# Patient Record
Sex: MALE | Race: WHITE | NOT HISPANIC OR LATINO | URBAN - METROPOLITAN AREA
[De-identification: names, ages, dates, MRNs, and addresses within clinical notes are randomized per-mention and may not be internally consistent; named-entity substitution may affect disease eponyms.]

---

## 2018-10-30 ENCOUNTER — OFFICE (OUTPATIENT)
Dept: URBAN - METROPOLITAN AREA CLINIC 33 | Facility: CLINIC | Age: 78
End: 2018-10-30

## 2018-10-30 VITALS
DIASTOLIC BLOOD PRESSURE: 85 MMHG | WEIGHT: 265 LBS | HEART RATE: 67 BPM | HEIGHT: 73 IN | TEMPERATURE: 97 F | SYSTOLIC BLOOD PRESSURE: 154 MMHG

## 2018-10-30 DIAGNOSIS — K21.9 GASTRO-ESOPHAGEAL REFLUX DISEASE WITHOUT ESOPHAGITIS: ICD-10-CM

## 2018-10-30 DIAGNOSIS — Z12.11 ENCOUNTER FOR SCREENING FOR MALIGNANT NEOPLASM OF COLON: ICD-10-CM

## 2018-10-30 PROCEDURE — 99204 OFFICE O/P NEW MOD 45 MIN: CPT

## 2018-10-30 NOTE — SERVICEHPINOTES
PAULO GUTIÉRREZ   is a   77   year old male who is being seen in consultation at the request of   RJ ARECHIGA   for colon cancer screening. Per patient, the last colonoscopy was done in 2008 at [facility]. That was the only colonoscopy he ever had. Denies personal history of colon polyps. No records to review at this time. BRReports having normal bowel movement daily on BSS type 4. Denies blood in stool or melena. Reports occasional diarrhea. Denies abdominal pain or weight loss. BRDenies nausea or vomiting. Reports acid reflux every a couple of days, depends on what he eats. Takes TUMs and it controls. Reports a long standing history of acid reflux 30-40 years. Never had an EGD. Denies trouble swallowing, epigastric pain or early satiety. Medical history includes a history of prostate cancer in 2013 s/p radiation therapy, diabetes, high cholesterol, anxiety, and thyroid disease. BRSurgical history include appendectomy in 1965 and C and spine surgery in 2015.Denies chest pain, palpitations or sob. Denies family history of colon cancer.  Takes 2 tabs of baby aspirin for prevention. Last blood work was done in August or September with pcp. No records to review at this time. BR

## 2018-12-18 ENCOUNTER — ON CAMPUS - OUTPATIENT (OUTPATIENT)
Dept: URBAN - METROPOLITAN AREA HOSPITAL 14 | Facility: HOSPITAL | Age: 78
End: 2018-12-18

## 2018-12-18 DIAGNOSIS — K63.5 POLYP OF COLON: ICD-10-CM

## 2018-12-18 DIAGNOSIS — K21.9 GASTRO-ESOPHAGEAL REFLUX DISEASE WITHOUT ESOPHAGITIS: ICD-10-CM

## 2018-12-18 DIAGNOSIS — Z12.11 ENCOUNTER FOR SCREENING FOR MALIGNANT NEOPLASM OF COLON: ICD-10-CM

## 2018-12-18 DIAGNOSIS — K22.70 BARRETT'S ESOPHAGUS WITHOUT DYSPLASIA: ICD-10-CM

## 2018-12-18 PROCEDURE — 43239 EGD BIOPSY SINGLE/MULTIPLE: CPT

## 2018-12-18 PROCEDURE — 45385 COLONOSCOPY W/LESION REMOVAL: CPT | Mod: PT
